# Patient Record
Sex: MALE | Race: WHITE | URBAN - METROPOLITAN AREA
[De-identification: names, ages, dates, MRNs, and addresses within clinical notes are randomized per-mention and may not be internally consistent; named-entity substitution may affect disease eponyms.]

---

## 2024-10-04 ENCOUNTER — OFFICE VISIT (OUTPATIENT)
Age: 34
End: 2024-10-04

## 2024-10-04 VITALS
HEIGHT: 62 IN | OXYGEN SATURATION: 94 % | TEMPERATURE: 98.2 F | SYSTOLIC BLOOD PRESSURE: 137 MMHG | WEIGHT: 191.8 LBS | BODY MASS INDEX: 35.3 KG/M2 | DIASTOLIC BLOOD PRESSURE: 84 MMHG | HEART RATE: 86 BPM

## 2024-10-04 DIAGNOSIS — J02.9 SORE THROAT: ICD-10-CM

## 2024-10-04 DIAGNOSIS — J01.00 ACUTE NON-RECURRENT MAXILLARY SINUSITIS: Primary | ICD-10-CM

## 2024-10-04 LAB — STREPTOCOCCUS A RNA: NEGATIVE

## 2024-10-04 RX ORDER — OMEPRAZOLE 40 MG/1
CAPSULE, DELAYED RELEASE ORAL
COMMUNITY
Start: 2024-09-30

## 2024-10-04 RX ORDER — OXYCODONE HYDROCHLORIDE 5 MG/1
TABLET ORAL
COMMUNITY
Start: 2024-08-22

## 2024-10-04 RX ORDER — TESTOSTERONE CYPIONATE 200 MG/ML
INJECTION, SOLUTION INTRAMUSCULAR
COMMUNITY
Start: 2024-07-29

## 2024-10-04 RX ORDER — FINASTERIDE 1 MG/1
1 TABLET, FILM COATED ORAL DAILY
COMMUNITY
Start: 2024-07-29

## 2024-10-04 RX ORDER — MONTELUKAST SODIUM 10 MG/1
10 TABLET ORAL DAILY
COMMUNITY
Start: 2024-09-06

## 2024-10-04 RX ORDER — NEEDLES, FILTER 19GX1 1/2"
NEEDLE, DISPOSABLE MISCELLANEOUS
COMMUNITY
Start: 2024-07-24

## 2024-10-04 RX ORDER — BENZONATATE 200 MG/1
200 CAPSULE ORAL 3 TIMES DAILY PRN
Qty: 21 CAPSULE | Refills: 0 | Status: SHIPPED | OUTPATIENT
Start: 2024-10-04 | End: 2024-10-11

## 2024-10-04 RX ORDER — CLONAZEPAM 0.5 MG/1
TABLET ORAL
COMMUNITY
Start: 2024-09-20

## 2024-10-04 RX ORDER — PRAZOSIN HYDROCHLORIDE 2 MG/1
4 CAPSULE ORAL NIGHTLY
COMMUNITY
Start: 2024-08-31

## 2024-10-04 ASSESSMENT — ENCOUNTER SYMPTOMS
SINUS PAIN: 1
SHORTNESS OF BREATH: 0
TROUBLE SWALLOWING: 0
SORE THROAT: 1
SINUS PRESSURE: 1
COUGH: 1

## 2024-10-04 NOTE — PROGRESS NOTES
Beau Lozano (:  1990) is a 33 y.o. male, New patient, here for evaluation of the following chief complaint(s):  Cough, Congestion, and Pharyngitis (Pt here for cough, congestion, sore-throat, body ache, and chest burning bcz of coughing since Monday.)    ASSESSMENT/PLAN:    ICD-10-CM    1. Acute non-recurrent maxillary sinusitis  J01.00 amoxicillin-clavulanate (AUGMENTIN) 875-125 MG per tablet     benzonatate (TESSALON) 200 MG capsule      2. Sore throat  J02.9 POCT Rapid Strep A DNA ([facility] i)        POC testing: Discussed result(s) with patient  Results for orders placed or performed in visit on 10/04/24   POCT Rapid Strep A DNA (Alere i)   Result Value Ref Range    Streptococcus A RNA negative      Ddx includes: Sinusitis, URI, Pneumonia,Bronchitis, allergic rhinitis  Disposition: Pt is 32yo adult here with c/o cough, congestion and sinus problems.VSS. Physical Exam as below. Augmentin rx sent as directed. PRN Tessalon Pearls rx also sent. ER and RTC precautions given.   Reviewed AVS with patient. All questions answered. If symptoms worsen go to the Emergency Department. Follow up in clinic or with PCP as needed. Patient is agreeable with plan.     SUBJECTIVE/OBJECTIVE:  32yo adult here with c/o cough, congestion, runny nose, sinus pressure/pain and sorethroat x1wk. Denies sob or cp. Care prior to arrival includes tylenol.       History provided by:  Patient   used: No      Vitals:    10/04/24 1626   BP: 137/84   Pulse: 86   Temp: 98.2 °F (36.8 °C)   TempSrc: Oral   SpO2: 94%   Weight: 87 kg (191 lb 12.8 oz)   Height: 1.575 m (5' 2\")     Review of Systems   Constitutional:  Positive for fatigue. Negative for chills and fever.   HENT:  Positive for postnasal drip, sinus pressure, sinus pain and sore throat. Negative for trouble swallowing.    Respiratory:  Positive for cough. Negative for shortness of breath.    Cardiovascular:  Negative for chest pain.   Musculoskeletal:

## 2024-10-04 NOTE — PATIENT INSTRUCTIONS
New Prescriptions    AMOXICILLIN-CLAVULANATE (AUGMENTIN) 875-125 MG PER TABLET    Take 1 tablet by mouth 2 times daily for 7 days    BENZONATATE (TESSALON) 200 MG CAPSULE    Take 1 capsule by mouth 3 times daily as needed for Cough     Increase fluid intake.  Ibuprofen and Tylenol for fever or pain (If not contraindicated).  Follow up with PCP in 1 week or sooner for worsening symptoms.